# Patient Record
Sex: FEMALE
[De-identification: names, ages, dates, MRNs, and addresses within clinical notes are randomized per-mention and may not be internally consistent; named-entity substitution may affect disease eponyms.]

---

## 2018-11-21 ENCOUNTER — HOSPITAL ENCOUNTER (EMERGENCY)
Dept: HOSPITAL 14 - H.ER | Age: 33
LOS: 1 days | Discharge: HOME | End: 2018-11-22
Payer: COMMERCIAL

## 2018-11-21 VITALS — RESPIRATION RATE: 20 BRPM | OXYGEN SATURATION: 99 %

## 2018-11-21 DIAGNOSIS — L23.9: Primary | ICD-10-CM

## 2018-11-22 VITALS — HEART RATE: 68 BPM | TEMPERATURE: 98.1 F | SYSTOLIC BLOOD PRESSURE: 132 MMHG | DIASTOLIC BLOOD PRESSURE: 75 MMHG

## 2018-11-22 NOTE — ED PDOC
HPI: Allergic Reaction


Time Seen by Provider: 11/21/18 23:49


Chief Complaint (Nursing): Abnormal Skin Integrity


History Per: Patient


History/Exam Limitations: no limitations


Onset/Duration Of Symptoms: Days


Current Symptoms Are (Timing): Better


Additional Complaint(s): 





No PMHx presenting with allergic reaction, states it started on Monday with rash

to arms and legs, states that she has had "throat closure" symptoms in the 

morning that resolve with hot tea.  States she may have been exposed to a tick 

that caused her to have red meat intolerance because she states she ate beef on 

Monday and had the rash and had beef again last night with rash.  STaets the 

rash has improved with benadryl.  No difficulty breathing, no wheezing, no other

syptoms, no other new exposures patient can think of.  No bites on skin. 





Past Medical History


Reviewed: Historical Data, Nursing Documentation, Vital Signs


Vital Signs: 


                                Last Vital Signs











Temp  98.1 F   11/22/18 01:50


 


Pulse  68   11/22/18 01:50


 


Resp  20   11/22/18 01:50


 


BP  132/75   11/22/18 01:50


 


Pulse Ox  99   11/22/18 01:50














- Medical History


PMH: No Chronic Diseases





- Family History


Family History: States: Unknown Family Hx





- Home Medications


Home Medications: 


                                Ambulatory Orders











 Medication  Instructions  Recorded


 


Epinephrine [Epipen] 0.3 mg IJ PRN PRN #2 auto.injct 11/22/18


 


Methylprednisolone [Medrol Dose 4 mg PO DAILY #21 mg 11/22/18





Pack (21 tabs)]  














- Allergies


Allergies/Adverse Reactions: 


                                    Allergies











Allergy/AdvReac Type Severity Reaction Status Date / Time


 


No Known Allergies Allergy   Verified 11/21/18 23:44














Review of Systems


ROS Statement: Except As Marked, All Systems Reviewed And Found Negative


Skin: Positive for: Rash





Physical Exam





- Reviewed


Nursing Documentation Reviewed: Yes


Vital Signs Reviewed: Yes





- Physical Exam


Appears: Positive for: Well, Non-toxic, No Acute Distress


Head Exam: Positive for: ATRAUMATIC, NORMAL INSPECTION, NORMOCEPHALIC


Skin: Positive for: Rash (Mild scattered urticarial rash to thighs and distal 

forearms/hands, blanching, no cellulitis)


Eye Exam: Positive for: EOMI, Normal appearance, PERRL


ENT: Positive for: Normal ENT Inspection


Neck: Positive for: Normal, Painless ROM


Cardiovascular/Chest: Positive for: Regular Rate, Rhythm


Respiratory: Positive for: CNT, Normal Breath Sounds


Gastrointestinal/Abdominal: Positive for: Normal Exam, Soft


Back: Positive for: Normal Inspection


Extremity: Positive for: Normal ROM


Neurologic/Psych: Positive for: Alert, CNs II-XII, Oriented





- ECG


O2 Sat by Pulse Oximetry: 99


Pulse Ox Interpretation: Normal





- Progress


ED Course And Treament: 





Patient presenting with uritcarial rash x 1 week


--Very well appearing, normal vitals, no signs of anaphylaxis or airway 

involvement


--Will treat symptomatically with benadryl


--Advised patient to followup with allergy/immunology for further testing





Disposition





- Clinical Impression


Clinical Impression: 


 Allergic dermatitis








- Disposition


Referrals: 


[facility] [Outside]


Disposition: Routine/Home


Disposition Time: 01:50


Condition: GOOD


Prescriptions: 


Epinephrine [Epipen] 0.3 mg IJ PRN PRN #2 auto.injct


 PRN Reason: Anaphylaxis


Methylprednisolone [Medrol Dose Pack (21 tabs)] 4 mg PO DAILY #21 mg


Instructions:  Hives (KAIL[facility])


Forms:  [facility] (English)